# Patient Record
Sex: MALE | Race: BLACK OR AFRICAN AMERICAN | NOT HISPANIC OR LATINO | Employment: FULL TIME
[De-identification: names, ages, dates, MRNs, and addresses within clinical notes are randomized per-mention and may not be internally consistent; named-entity substitution may affect disease eponyms.]

---

## 2017-09-01 ENCOUNTER — HOSPITAL (OUTPATIENT)
Dept: OTHER | Age: 52
End: 2017-09-01
Attending: EMERGENCY MEDICINE

## 2017-09-01 LAB
A/G RATIO_: 1.1
ABS LYMPH: 2.2 K/CUMM (ref 1–3.5)
ABS MONO: 0.7 K/CUMM (ref 0.1–0.8)
ABS NEUTRO: 3.8 K/CUMM (ref 2–8)
ALBUMIN: 3.9 G/DL (ref 3.5–5)
ALK PHOS: 98 UNIT/L (ref 50–124)
ALT/GPT: 27 UNIT/L (ref 0–55)
ANION GAP SERPL CALC-SCNC: 14 MEQ/L (ref 10–20)
AST/GOT: 17 UNIT/L (ref 5–34)
BASOPHIL: 1 % (ref 0–1)
BILI TOTAL: 0.4 MG/DL (ref 0.2–1)
BUN SERPL-MCNC: 16 MG/DL (ref 6–20)
CALCIUM: 9.5 MG/DL (ref 8.4–10.2)
CHLORIDE: 106 MEQ/L (ref 97–107)
CREATININE: 1.05 MG/DL (ref 0.6–1.3)
DEVICE SN: ABNORMAL
DIFF_TYPE?: NORMAL
EOSINOPHIL: 3 % (ref 0–6)
GLOBULIN_: 3.6 G/DL (ref 2–4.1)
GLUCOSE LVL: 183 MG/DL (ref 70–99)
HCT VFR BLD CALC: 39 % (ref 36–51)
HEMOLYSIS 2+: NEGATIVE
HEMOLYSIS 4+: NEGATIVE
HGB BLD-MCNC: 13.6 G/DL (ref 12–17)
ICTERIC 4+: NEGATIVE
IMMATURE GRAN: 0.3 % (ref 0–0.3)
INSTR WBC: 6.9 K/CUMM (ref 4–11)
LIPEMIC 3+: NEGATIVE
LYMPHOCYTE: 31 %
MCH RBC QN AUTO: 28 PG (ref 25–35)
MCHC RBC AUTO-ENTMCNC: 35 G/DL (ref 32–37)
MCV RBC AUTO: 82 FL (ref 78–97)
MONOCYTE: 10 %
NEUTROPHIL: 55 %
NRBC BLD MANUAL-RTO: 0 % (ref 0–0.2)
PLATELET: 191 K/CUMM (ref 150–450)
POC_GLU: 171 MG/DL (ref 70–99)
POTASSIUM: 3.8 MEQ/L (ref 3.5–5.1)
RBC # BLD: 4.8 M/CUMM (ref 4.2–6)
RDW: 13.2 % (ref 11.5–14.5)
SODIUM: 138 MEQ/L (ref 136–145)
TCO2: 22 MEQ/L (ref 19–29)
TECH_ID: ABNORMAL
TOTAL PROTEIN: 7.5 G/DL (ref 6.4–8.3)
TROPONIN I: <0.01 NG/ML
UA APPEAR: CLEAR
UA BILI: NEGATIVE
UA BLOOD: NEGATIVE
UA COLOR: YELLOW
UA GLUCOSE: ABNORMAL
UA KETONES: NEGATIVE
UA LEUK EST: NEGATIVE
UA NITRITE: NEGATIVE
UA PH: 5.5 (ref 5–7)
UA PROTEIN: NEGATIVE
UA SPEC GRAV: >=1.03 (ref 1.01–1.02)
UA UROBILINOGEN: 0.2 MG/DL (ref 0.2–1)
WBC # BLD: 6.9 K/CUMM (ref 4–11)

## 2019-05-06 ENCOUNTER — HOSPITAL ENCOUNTER (EMERGENCY)
Facility: HOSPITAL | Age: 54
Discharge: HOME | End: 2019-05-06
Attending: EMERGENCY MEDICINE
Payer: COMMERCIAL

## 2019-05-06 ENCOUNTER — APPOINTMENT (OUTPATIENT)
Dept: GENERAL RADIOLOGY | Facility: HOSPITAL | Age: 54
End: 2019-05-06
Attending: EMERGENCY MEDICINE
Payer: COMMERCIAL

## 2019-05-06 VITALS
RESPIRATION RATE: 20 BRPM | OXYGEN SATURATION: 99 % | BODY MASS INDEX: 35.76 KG/M2 | SYSTOLIC BLOOD PRESSURE: 116 MMHG | DIASTOLIC BLOOD PRESSURE: 82 MMHG | HEART RATE: 88 BPM | TEMPERATURE: 98 F | HEIGHT: 72 IN | WEIGHT: 264 LBS

## 2019-05-06 DIAGNOSIS — R07.9 ACUTE CHEST PAIN: Primary | ICD-10-CM

## 2019-05-06 PROCEDURE — 93005 ELECTROCARDIOGRAM TRACING: CPT

## 2019-05-06 PROCEDURE — 93010 ELECTROCARDIOGRAM REPORT: CPT | Performed by: EMERGENCY MEDICINE

## 2019-05-06 PROCEDURE — 80048 BASIC METABOLIC PNL TOTAL CA: CPT

## 2019-05-06 PROCEDURE — 85025 COMPLETE CBC W/AUTO DIFF WBC: CPT | Performed by: EMERGENCY MEDICINE

## 2019-05-06 PROCEDURE — 84484 ASSAY OF TROPONIN QUANT: CPT

## 2019-05-06 PROCEDURE — 85025 COMPLETE CBC W/AUTO DIFF WBC: CPT

## 2019-05-06 PROCEDURE — 85379 FIBRIN DEGRADATION QUANT: CPT | Performed by: EMERGENCY MEDICINE

## 2019-05-06 PROCEDURE — 84484 ASSAY OF TROPONIN QUANT: CPT | Performed by: EMERGENCY MEDICINE

## 2019-05-06 PROCEDURE — 80048 BASIC METABOLIC PNL TOTAL CA: CPT | Performed by: EMERGENCY MEDICINE

## 2019-05-06 PROCEDURE — 36415 COLL VENOUS BLD VENIPUNCTURE: CPT

## 2019-05-06 PROCEDURE — 99285 EMERGENCY DEPT VISIT HI MDM: CPT

## 2019-05-06 PROCEDURE — 71046 X-RAY EXAM CHEST 2 VIEWS: CPT | Performed by: EMERGENCY MEDICINE

## 2019-05-06 NOTE — ED NOTES
Pt states  For the past few months has been having intermittent chest pain. States intermittent lt sided sharp pain last 1-2 minutes then subsides. States occasionally has lt arm pain but not always.  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

## 2019-05-06 NOTE — ED INITIAL ASSESSMENT (HPI)
Intermittent chest pains for a \"few months\"    Felt worse today so came to get checked out.  Also reports his blood sugars are high

## 2019-05-07 NOTE — ED PROVIDER NOTES
Patient Seen in: [facility] AND [facility] Emergency Department    History   Patient presents with:  Chest Pain Angina (cardiovascular)    Stated Complaint:     HPI    59-year-old male presents for complaint of chest pain.   Patient states that his pain has been Narrative: The following orders were created for panel order CBC WITH DIFFERENTIAL WITH PLATELET.   Procedure                               Abnormality         Status                     ---------                               -----------         ------ needed. He is pain free at discharge and neurovascularly intact. Imaging:   Xr Chest Pa + Lat Chest (cpt=71046)    Result Date: 5/6/2019  PROCEDURE: XR CHEST PA + LAT CHEST (CPT=71020)  COMPARISON: None.   INDICATIONS: Chest pain for several weeks occa

## 2021-03-29 ENCOUNTER — WALK IN (OUTPATIENT)
Dept: URGENT CARE | Age: 56
End: 2021-03-29
Attending: FAMILY MEDICINE

## 2021-03-29 DIAGNOSIS — J98.01 BRONCHOSPASM: ICD-10-CM

## 2021-03-29 DIAGNOSIS — H68.101 OBSTRUCTION OF RIGHT EUSTACHIAN TUBE: Primary | ICD-10-CM

## 2021-03-29 DIAGNOSIS — J40 BRONCHITIS: ICD-10-CM

## 2021-03-29 PROCEDURE — 99213 OFFICE O/P EST LOW 20 MIN: CPT

## 2021-03-29 RX ORDER — ASPIRIN 81 MG/1
81 TABLET, CHEWABLE ORAL
COMMUNITY
Start: 2014-10-13

## 2021-03-29 RX ORDER — LOSARTAN POTASSIUM 25 MG/1
TABLET ORAL
COMMUNITY
Start: 2020-07-28

## 2021-03-29 RX ORDER — TRETINOIN 0.5 MG/G
CREAM TOPICAL
COMMUNITY
Start: 2021-02-25

## 2021-03-29 RX ORDER — ALBUTEROL SULFATE 90 UG/1
2 AEROSOL, METERED RESPIRATORY (INHALATION) EVERY 4 HOURS PRN
Qty: 1 INHALER | Refills: 0 | Status: SHIPPED | OUTPATIENT
Start: 2021-03-29 | End: 2021-04-28

## 2021-03-29 RX ORDER — TRIAMCINOLONE ACETONIDE 0.25 MG/G
CREAM TOPICAL
COMMUNITY
Start: 2021-02-25

## 2021-03-29 RX ORDER — MULTIVITAMIN WITH FOLIC ACID 400 MCG
1 TABLET ORAL
COMMUNITY

## 2021-03-29 RX ORDER — FLUTICASONE PROPIONATE 50 MCG
2 SPRAY, SUSPENSION (ML) NASAL DAILY
Qty: 16 G | Refills: 12 | Status: SHIPPED | OUTPATIENT
Start: 2021-03-29

## 2021-03-29 RX ORDER — SILDENAFIL CITRATE 20 MG/1
TABLET ORAL
COMMUNITY
Start: 2020-12-21

## 2021-03-29 RX ORDER — DULAGLUTIDE 1.5 MG/.5ML
INJECTION, SOLUTION SUBCUTANEOUS
COMMUNITY
Start: 2021-03-10

## 2021-03-29 RX ORDER — LANCETS
1 EACH MISCELLANEOUS
COMMUNITY
Start: 2016-07-18

## 2021-03-29 RX ORDER — AZITHROMYCIN 250 MG/1
TABLET, FILM COATED ORAL
Qty: 6 TABLET | Refills: 0 | Status: SHIPPED | OUTPATIENT
Start: 2021-03-29

## 2021-03-29 RX ORDER — METFORMIN HYDROCHLORIDE 750 MG/1
750 TABLET, EXTENDED RELEASE ORAL 2 TIMES DAILY
COMMUNITY
Start: 2021-01-14

## 2021-03-29 ASSESSMENT — ENCOUNTER SYMPTOMS
FACIAL SWELLING: 0
FEVER: 0
SORE THROAT: 0
CHILLS: 0
COLOR CHANGE: 0
SHORTNESS OF BREATH: 0
WHEEZING: 0
COUGH: 1
SLEEP DISTURBANCE: 0
SINUS PAIN: 1

## 2021-03-29 ASSESSMENT — PAIN SCALES - GENERAL: PAINLEVEL: 5-6

## 2021-04-01 ENCOUNTER — WALK IN (OUTPATIENT)
Dept: URGENT CARE | Age: 56
End: 2021-04-01
Attending: EMERGENCY MEDICINE

## 2021-04-01 DIAGNOSIS — L03.011 PARONYCHIA OF FINGER OF RIGHT HAND: Primary | ICD-10-CM

## 2021-04-01 PROCEDURE — 10160 PNXR ASPIR ABSC HMTMA BULLA: CPT

## 2021-04-01 PROCEDURE — 99212 OFFICE O/P EST SF 10 MIN: CPT

## 2021-04-01 RX ORDER — SULFAMETHOXAZOLE AND TRIMETHOPRIM 800; 160 MG/1; MG/1
1 TABLET ORAL 2 TIMES DAILY
Qty: 6 TABLET | Refills: 0 | Status: SHIPPED | OUTPATIENT
Start: 2021-04-01 | End: 2021-04-04

## 2021-04-01 ASSESSMENT — ENCOUNTER SYMPTOMS
CONFUSION: 0
COUGH: 0
VOMITING: 0
CHILLS: 0
FEVER: 0
BACK PAIN: 0
NAUSEA: 0
SINUS PAIN: 0
HEADACHES: 0
SHORTNESS OF BREATH: 0
ABDOMINAL PAIN: 0

## 2021-04-01 ASSESSMENT — PAIN SCALES - GENERAL: PAINLEVEL: 1-2

## 2021-05-26 VITALS
RESPIRATION RATE: 16 BRPM | WEIGHT: 270 LBS | HEIGHT: 72 IN | DIASTOLIC BLOOD PRESSURE: 92 MMHG | OXYGEN SATURATION: 99 % | HEART RATE: 96 BPM | SYSTOLIC BLOOD PRESSURE: 130 MMHG | BODY MASS INDEX: 36.57 KG/M2 | WEIGHT: 270 LBS | TEMPERATURE: 98.2 F | HEIGHT: 72 IN | HEART RATE: 100 BPM | RESPIRATION RATE: 16 BRPM | BODY MASS INDEX: 36.57 KG/M2 | SYSTOLIC BLOOD PRESSURE: 158 MMHG | DIASTOLIC BLOOD PRESSURE: 83 MMHG | OXYGEN SATURATION: 98 % | TEMPERATURE: 97.9 F

## 2022-01-17 ENCOUNTER — HOSPITAL ENCOUNTER (OUTPATIENT)
Dept: GENERAL RADIOLOGY | Age: 57
Discharge: HOME | End: 2022-01-17
Attending: EMERGENCY MEDICINE

## 2022-01-17 ENCOUNTER — WALK IN (OUTPATIENT)
Dept: URGENT CARE | Age: 57
End: 2022-01-17
Attending: EMERGENCY MEDICINE

## 2022-01-17 VITALS
TEMPERATURE: 98.2 F | OXYGEN SATURATION: 98 % | RESPIRATION RATE: 16 BRPM | HEART RATE: 100 BPM | DIASTOLIC BLOOD PRESSURE: 93 MMHG | SYSTOLIC BLOOD PRESSURE: 159 MMHG

## 2022-01-17 DIAGNOSIS — L03.012 PARONYCHIA OF FINGER OF LEFT HAND: ICD-10-CM

## 2022-01-17 DIAGNOSIS — M79.89 FINGER SWELLING: ICD-10-CM

## 2022-01-17 DIAGNOSIS — L03.012 PARONYCHIA OF FINGER OF LEFT HAND: Primary | ICD-10-CM

## 2022-01-17 DIAGNOSIS — L03.012 CELLULITIS OF FINGER OF LEFT HAND: ICD-10-CM

## 2022-01-17 PROCEDURE — 10060 I&D ABSCESS SIMPLE/SINGLE: CPT

## 2022-01-17 PROCEDURE — 87205 SMEAR GRAM STAIN: CPT | Performed by: NURSE PRACTITIONER

## 2022-01-17 PROCEDURE — 73140 X-RAY EXAM OF FINGER(S): CPT

## 2022-01-17 PROCEDURE — 99213 OFFICE O/P EST LOW 20 MIN: CPT

## 2022-01-17 RX ORDER — CEPHALEXIN 500 MG/1
500 CAPSULE ORAL 2 TIMES DAILY
Qty: 20 CAPSULE | Refills: 0 | Status: SHIPPED | OUTPATIENT
Start: 2022-01-17 | End: 2022-01-27

## 2022-01-17 ASSESSMENT — ENCOUNTER SYMPTOMS
TINGLING: 0
WHEEZING: 0
VOMITING: 0
WEAKNESS: 0
HEMOPTYSIS: 0
CHILLS: 0
ABDOMINAL PAIN: 0
SORE THROAT: 0
FEVER: 0
SPUTUM PRODUCTION: 0
EYE PAIN: 0
DIARRHEA: 0
EYE DISCHARGE: 0
NAUSEA: 0
FOCAL WEAKNESS: 0
COUGH: 0
SHORTNESS OF BREATH: 0
EYE REDNESS: 0
DIZZINESS: 0

## 2022-01-19 ENCOUNTER — WALK IN (OUTPATIENT)
Dept: URGENT CARE | Age: 57
End: 2022-01-19
Attending: EMERGENCY MEDICINE

## 2022-01-19 VITALS
RESPIRATION RATE: 18 BRPM | WEIGHT: 270 LBS | TEMPERATURE: 97.9 F | SYSTOLIC BLOOD PRESSURE: 163 MMHG | OXYGEN SATURATION: 100 % | HEART RATE: 93 BPM | DIASTOLIC BLOOD PRESSURE: 98 MMHG | BODY MASS INDEX: 36.62 KG/M2

## 2022-01-19 DIAGNOSIS — L03.012 PARONYCHIA OF FINGER OF LEFT HAND: Primary | ICD-10-CM

## 2022-01-19 PROCEDURE — 10060 I&D ABSCESS SIMPLE/SINGLE: CPT

## 2022-01-19 PROCEDURE — 10002801 HB RX 250 W/O HCPCS: Performed by: EMERGENCY MEDICINE

## 2022-01-19 PROCEDURE — 99212 OFFICE O/P EST SF 10 MIN: CPT

## 2022-01-19 RX ORDER — SULFAMETHOXAZOLE AND TRIMETHOPRIM 800; 160 MG/1; MG/1
1 TABLET ORAL 2 TIMES DAILY
Qty: 20 TABLET | Refills: 0 | Status: SHIPPED | OUTPATIENT
Start: 2022-01-19 | End: 2022-01-29

## 2022-01-19 RX ORDER — LIDOCAINE HYDROCHLORIDE 10 MG/ML
2 INJECTION, SOLUTION EPIDURAL; INFILTRATION; INTRACAUDAL; PERINEURAL ONCE
Status: COMPLETED | OUTPATIENT
Start: 2022-01-19 | End: 2022-01-19

## 2022-01-19 RX ADMIN — LIDOCAINE HYDROCHLORIDE 20 MG: 10 INJECTION, SOLUTION EPIDURAL; INFILTRATION; INTRACAUDAL; PERINEURAL at 13:14

## 2022-01-19 ASSESSMENT — ENCOUNTER SYMPTOMS
SHORTNESS OF BREATH: 0
COUGH: 0
SINUS PAIN: 0
ABDOMINAL PAIN: 0
BACK PAIN: 0
NAUSEA: 0
VOMITING: 0
HEADACHES: 0
CONFUSION: 0
CHILLS: 0
FEVER: 0

## 2022-01-21 LAB
BACTERIA SPEC ANAEROBE+AEROBE CULT: ABNORMAL
BACTERIA SPEC ANAEROBE+AEROBE CULT: ABNORMAL
GRAM STN SPEC: ABNORMAL

## 2022-01-28 ENCOUNTER — WALK IN (OUTPATIENT)
Dept: URGENT CARE | Age: 57
End: 2022-01-28
Attending: EMERGENCY MEDICINE

## 2022-01-28 VITALS
HEIGHT: 72 IN | HEART RATE: 90 BPM | BODY MASS INDEX: 36.57 KG/M2 | RESPIRATION RATE: 18 BRPM | TEMPERATURE: 97.4 F | DIASTOLIC BLOOD PRESSURE: 93 MMHG | WEIGHT: 270 LBS | SYSTOLIC BLOOD PRESSURE: 163 MMHG | OXYGEN SATURATION: 100 %

## 2022-01-28 DIAGNOSIS — L03.012 PARONYCHIA OF FINGER OF LEFT HAND: Primary | ICD-10-CM

## 2022-01-28 PROCEDURE — 10060 I&D ABSCESS SIMPLE/SINGLE: CPT

## 2022-01-28 PROCEDURE — 64450 NJX AA&/STRD OTHER PN/BRANCH: CPT

## 2022-01-28 PROCEDURE — 99212 OFFICE O/P EST SF 10 MIN: CPT

## 2022-01-28 RX ORDER — CEPHALEXIN 500 MG/1
500 CAPSULE ORAL 4 TIMES DAILY
Qty: 12 CAPSULE | Refills: 0 | Status: SHIPPED | OUTPATIENT
Start: 2022-01-28 | End: 2022-01-31

## 2022-01-28 RX ORDER — LIDOCAINE HYDROCHLORIDE 10 MG/ML
2 INJECTION, SOLUTION EPIDURAL; INFILTRATION; INTRACAUDAL; PERINEURAL ONCE
Status: COMPLETED | OUTPATIENT
Start: 2022-01-28 | End: 2022-02-07

## 2022-01-28 ASSESSMENT — ENCOUNTER SYMPTOMS
SPEECH DIFFICULTY: 0
POLYPHAGIA: 0
APPETITE CHANGE: 0
CHEST TIGHTNESS: 0
NAUSEA: 0
VOMITING: 0
SORE THROAT: 0
FEVER: 0
COLOR CHANGE: 0
VOICE CHANGE: 0
DIZZINESS: 0
EYE REDNESS: 0
NUMBNESS: 0
HEADACHES: 0
AGITATION: 0
SHORTNESS OF BREATH: 0
SINUS PAIN: 0
HALLUCINATIONS: 0
EYE DISCHARGE: 0
BACK PAIN: 0
DIARRHEA: 0
COUGH: 0
RECTAL PAIN: 0
ABDOMINAL PAIN: 0
ABDOMINAL DISTENTION: 0
CHILLS: 0

## 2022-01-28 ASSESSMENT — PAIN SCALES - GENERAL: PAINLEVEL: 2

## 2023-07-05 ENCOUNTER — WALK IN (OUTPATIENT)
Dept: URGENT CARE | Age: 58
End: 2023-07-05
Attending: FAMILY MEDICINE

## 2023-07-05 VITALS
SYSTOLIC BLOOD PRESSURE: 150 MMHG | TEMPERATURE: 98.1 F | DIASTOLIC BLOOD PRESSURE: 94 MMHG | RESPIRATION RATE: 20 BRPM | OXYGEN SATURATION: 98 % | HEART RATE: 94 BPM

## 2023-07-05 DIAGNOSIS — L03.011 ACUTE PARONYCHIA OF FINGER OF RIGHT HAND: Primary | ICD-10-CM

## 2023-07-05 PROCEDURE — 87077 CULTURE AEROBIC IDENTIFY: CPT | Performed by: FAMILY MEDICINE

## 2023-07-05 PROCEDURE — 99212 OFFICE O/P EST SF 10 MIN: CPT

## 2023-07-05 PROCEDURE — 10060 I&D ABSCESS SIMPLE/SINGLE: CPT

## 2023-07-05 RX ORDER — CLINDAMYCIN HYDROCHLORIDE 300 MG/1
300 CAPSULE ORAL 3 TIMES DAILY
Qty: 30 CAPSULE | Refills: 0 | Status: SHIPPED | OUTPATIENT
Start: 2023-07-05 | End: 2023-07-15

## 2023-07-05 ASSESSMENT — PAIN SCALES - GENERAL: PAINLEVEL: 6

## 2023-07-05 ASSESSMENT — ENCOUNTER SYMPTOMS
NEUROLOGICAL NEGATIVE: 1
GASTROINTESTINAL NEGATIVE: 1
CONSTITUTIONAL NEGATIVE: 1
RESPIRATORY NEGATIVE: 1
WOUND: 1
HEMATOLOGIC/LYMPHATIC NEGATIVE: 1
ENDOCRINE NEGATIVE: 1
EYES NEGATIVE: 1

## 2023-07-10 ENCOUNTER — WALK IN (OUTPATIENT)
Dept: URGENT CARE | Age: 58
End: 2023-07-10
Attending: FAMILY MEDICINE

## 2023-07-10 VITALS
HEART RATE: 99 BPM | SYSTOLIC BLOOD PRESSURE: 155 MMHG | BODY MASS INDEX: 37.25 KG/M2 | OXYGEN SATURATION: 95 % | RESPIRATION RATE: 18 BRPM | DIASTOLIC BLOOD PRESSURE: 101 MMHG | HEIGHT: 72 IN | WEIGHT: 275 LBS

## 2023-07-10 DIAGNOSIS — R19.7 DIARRHEA, UNSPECIFIED TYPE: Primary | ICD-10-CM

## 2023-07-10 DIAGNOSIS — L03.011 PARONYCHIA OF FINGER OF RIGHT HAND: ICD-10-CM

## 2023-07-10 DIAGNOSIS — E11.00 TYPE II DIABETES MELLITUS WITH HYPEROSMOLARITY, UNCONTROLLED (CMD): ICD-10-CM

## 2023-07-10 DIAGNOSIS — E11.65 TYPE II DIABETES MELLITUS WITH HYPEROSMOLARITY, UNCONTROLLED (CMD): ICD-10-CM

## 2023-07-10 PROCEDURE — 99213 OFFICE O/P EST LOW 20 MIN: CPT

## 2023-07-10 RX ORDER — METRONIDAZOLE 500 MG/1
500 TABLET ORAL 3 TIMES DAILY
Qty: 30 TABLET | Refills: 0 | Status: SHIPPED | OUTPATIENT
Start: 2023-07-10 | End: 2023-07-20

## 2023-07-10 RX ORDER — CEPHALEXIN 500 MG/1
500 CAPSULE ORAL 3 TIMES DAILY
Qty: 21 CAPSULE | Refills: 0 | Status: SHIPPED | OUTPATIENT
Start: 2023-07-10 | End: 2023-07-17

## 2023-07-10 RX ORDER — METRONIDAZOLE 500 MG/1
500 TABLET ORAL 3 TIMES DAILY
Qty: 30 TABLET | Refills: 0 | Status: SHIPPED | OUTPATIENT
Start: 2023-07-10 | End: 2023-07-10 | Stop reason: SDUPTHER

## 2023-07-10 ASSESSMENT — PAIN SCALES - GENERAL: PAINLEVEL: 0

## (undated) NOTE — ED AVS SNAPSHOT
Mina Bird   MRN: [id number]    Department:  [facility] Emergency Department   Date of Visit:  5/6/2019           Disclosure     Insurance plans vary and the physician(s) referred by the ER may not be covered by your plan.  Please contact your i CARE PHYSICIAN AT ONCE OR RETURN IMMEDIATELY TO THE EMERGENCY DEPARTMENT. If you have been prescribed any medication(s), please fill your prescription right away and begin taking the medication(s) as directed.   If you believe that any of the medications